# Patient Record
Sex: FEMALE | Race: WHITE | HISPANIC OR LATINO | URBAN - METROPOLITAN AREA
[De-identification: names, ages, dates, MRNs, and addresses within clinical notes are randomized per-mention and may not be internally consistent; named-entity substitution may affect disease eponyms.]

---

## 2017-10-10 ENCOUNTER — NEW PATIENT (OUTPATIENT)
Dept: URBAN - METROPOLITAN AREA CLINIC 10 | Facility: CLINIC | Age: 37
End: 2017-10-10
Payer: COMMERCIAL

## 2017-10-10 DIAGNOSIS — Z79.84 LONG TERM (CURRENT) USE OF ORAL ANTIDIABETIC DRUGS: ICD-10-CM

## 2017-10-10 DIAGNOSIS — E11.9 TYPE 2 DIABETES MELLITUS WITHOUT COMPLICATIONS: Primary | ICD-10-CM

## 2017-10-10 PROCEDURE — 92250 FUNDUS PHOTOGRAPHY W/I&R: CPT | Performed by: OPTOMETRIST

## 2017-10-10 PROCEDURE — 92004 COMPRE OPH EXAM NEW PT 1/>: CPT | Performed by: OPTOMETRIST

## 2017-10-10 ASSESSMENT — VISUAL ACUITY
OS: 20/20
OD: 20/20

## 2017-10-10 ASSESSMENT — INTRAOCULAR PRESSURE
OD: 15
OS: 14

## 2017-10-10 ASSESSMENT — KERATOMETRY
OD: 43.88
OS: 44.13

## 2019-03-01 ENCOUNTER — FOLLOW UP ESTABLISHED (OUTPATIENT)
Dept: URBAN - METROPOLITAN AREA CLINIC 10 | Facility: CLINIC | Age: 39
End: 2019-03-01
Payer: COMMERCIAL

## 2019-03-01 PROCEDURE — 92014 COMPRE OPH EXAM EST PT 1/>: CPT | Performed by: OPTOMETRIST

## 2019-03-01 PROCEDURE — 92015 DETERMINE REFRACTIVE STATE: CPT | Performed by: OPTOMETRIST

## 2019-03-01 ASSESSMENT — KERATOMETRY
OS: 44.25
OD: 43.88

## 2019-03-01 ASSESSMENT — VISUAL ACUITY
OD: 20/20
OS: 20/20

## 2019-03-01 ASSESSMENT — INTRAOCULAR PRESSURE
OD: 19
OS: 19

## 2021-10-29 ENCOUNTER — OFFICE VISIT (OUTPATIENT)
Dept: URBAN - METROPOLITAN AREA CLINIC 10 | Facility: CLINIC | Age: 41
End: 2021-10-29
Payer: COMMERCIAL

## 2021-10-29 PROCEDURE — 92134 CPTRZ OPH DX IMG PST SGM RTA: CPT | Performed by: STUDENT IN AN ORGANIZED HEALTH CARE EDUCATION/TRAINING PROGRAM

## 2021-10-29 PROCEDURE — 92014 COMPRE OPH EXAM EST PT 1/>: CPT | Performed by: STUDENT IN AN ORGANIZED HEALTH CARE EDUCATION/TRAINING PROGRAM

## 2021-10-29 ASSESSMENT — INTRAOCULAR PRESSURE
OS: 20
OD: 19

## 2021-10-29 ASSESSMENT — VISUAL ACUITY
OD: 20/20
OS: 20/20

## 2021-10-29 NOTE — IMPRESSION/PLAN
Impression: Type 2 diabetes mellitus without complications: H17.3. Plan: Patient educated on condition, importance of diet, exercise, and regular follow ups with PCP. No NPDR or DME present, good blood sugar control continue to monitor with annual DM DFE.

## 2022-11-02 ENCOUNTER — OFFICE VISIT (OUTPATIENT)
Dept: URBAN - METROPOLITAN AREA CLINIC 10 | Facility: CLINIC | Age: 42
End: 2022-11-02
Payer: COMMERCIAL

## 2022-11-02 DIAGNOSIS — H52.13 MYOPIA, BILATERAL: ICD-10-CM

## 2022-11-02 DIAGNOSIS — E11.9 TYPE 2 DIABETES MELLITUS WITHOUT COMPLICATIONS: Primary | ICD-10-CM

## 2022-11-02 DIAGNOSIS — Z79.84 LONG TERM (CURRENT) USE OF ORAL ANTIDIABETIC DRUGS: ICD-10-CM

## 2022-11-02 DIAGNOSIS — H04.123 DRY EYE SYNDROME OF BILATERAL LACRIMAL GLANDS: ICD-10-CM

## 2022-11-02 PROCEDURE — 92014 COMPRE OPH EXAM EST PT 1/>: CPT | Performed by: STUDENT IN AN ORGANIZED HEALTH CARE EDUCATION/TRAINING PROGRAM

## 2022-11-02 ASSESSMENT — INTRAOCULAR PRESSURE
OD: 12
OS: 12

## 2022-11-02 ASSESSMENT — VISUAL ACUITY
OS: 20/20
OD: 20/20

## 2022-11-02 NOTE — IMPRESSION/PLAN
Impression: Dry eye syndrome of bilateral lacrimal glands: H04.123. Plan: Patient educated on signs and symptoms of dry eye and importance of environmental factors.  Discussed using OTC AT's with patient YONATAN PICKERING long-term

## 2022-11-02 NOTE — IMPRESSION/PLAN
Impression: Type 2 diabetes mellitus without complications: R15.9. Plan: Patient educated on condition, importance of diet, exercise, and regular follow ups with PCP. No NPDR or DME present, good blood sugar control continue to monitor with annual DM DFE.